# Patient Record
Sex: MALE | Race: WHITE | NOT HISPANIC OR LATINO | Employment: FULL TIME | URBAN - NONMETROPOLITAN AREA
[De-identification: names, ages, dates, MRNs, and addresses within clinical notes are randomized per-mention and may not be internally consistent; named-entity substitution may affect disease eponyms.]

---

## 2023-06-22 ENCOUNTER — OFFICE VISIT (OUTPATIENT)
Dept: PRIMARY CARE | Facility: CLINIC | Age: 48
End: 2023-06-22
Payer: COMMERCIAL

## 2023-06-22 VITALS
HEIGHT: 72 IN | WEIGHT: 228.5 LBS | SYSTOLIC BLOOD PRESSURE: 140 MMHG | DIASTOLIC BLOOD PRESSURE: 100 MMHG | OXYGEN SATURATION: 100 % | BODY MASS INDEX: 30.95 KG/M2 | HEART RATE: 63 BPM

## 2023-06-22 DIAGNOSIS — G44.85 PRIMARY STABBING HEADACHE: Primary | ICD-10-CM

## 2023-06-22 PROCEDURE — 99214 OFFICE O/P EST MOD 30 MIN: CPT | Performed by: FAMILY MEDICINE

## 2023-06-22 PROCEDURE — 1036F TOBACCO NON-USER: CPT | Performed by: FAMILY MEDICINE

## 2023-06-22 RX ORDER — PROPRANOLOL HYDROCHLORIDE 120 MG/1
120 CAPSULE, EXTENDED RELEASE ORAL NIGHTLY
Qty: 30 CAPSULE | Refills: 0 | Status: SHIPPED | OUTPATIENT
Start: 2023-06-22 | End: 2023-07-17 | Stop reason: ALTCHOICE

## 2023-06-22 RX ORDER — IBUPROFEN 600 MG/1
TABLET ORAL
COMMUNITY

## 2023-06-22 ASSESSMENT — PATIENT HEALTH QUESTIONNAIRE - PHQ9
1. LITTLE INTEREST OR PLEASURE IN DOING THINGS: NOT AT ALL
2. FEELING DOWN, DEPRESSED OR HOPELESS: NOT AT ALL
SUM OF ALL RESPONSES TO PHQ9 QUESTIONS 1 AND 2: 0

## 2023-06-22 NOTE — PROGRESS NOTES
"Subjective   Patient ID: Iva Greenwood is a 48 y.o. male who presents for ER f/u and mdck.    HPI   ED for cp neg ct chest cmp bmp and troponin  Granuloma chronic     Went to ED had BM and nose started running and plugged and blew nose with with mucus  Then N/V none for 5 minutes   Otherwise felt fine except for HA since last thursdya  Sob and lightheaded after shower  And was white pale  5 days now constant on the right side   Temple to occipital and hurts to open right eye due to photophobia  Yesterday am no HA  then sneezed had HA acutely   Not as sharp but dull   Never has UNGER like this before   This is different than his HA usuallty that he gets maybe 2 x year    Bp high in ed and today   No cp palpitations s  Pulse ox at home and bp cuff  130 /90   10/10 HA at times     Review of Systems  No visual changes  HA start 2 days before ED visits   Ibuprofen and aspirin  Acute onset HA start in temple     Objective   BP (!) 140/100   Pulse 63   Ht 1.828 m (5' 11.97\")   Wt 104 kg (228 lb 8 oz)   SpO2 100%   BMI 31.02 kg/m²     Physical Exam  Constitutional:       Appearance: Normal appearance.   HENT:      Head: Normocephalic and atraumatic.   Eyes:      Conjunctiva/sclera: Conjunctivae normal.      Pupils: Pupils are equal, round, and reactive to light.   Cardiovascular:      Rate and Rhythm: Normal rate and regular rhythm.      Heart sounds: Normal heart sounds.   Pulmonary:      Effort: Pulmonary effort is normal.      Breath sounds: Normal breath sounds.   Lymphadenopathy:      Cervical: No cervical adenopathy.   Skin:     Coloration: Skin is not jaundiced.   Neurological:      General: No focal deficit present.      Mental Status: He is alert and oriented to person, place, and time.      Cranial Nerves: No cranial nerve deficit.      Sensory: No sensory deficit.      Motor: No weakness.      Coordination: Coordination normal.      Gait: Gait normal.      Deep Tendon Reflexes: Reflexes normal.   Psychiatric:   "       Mood and Affect: Mood normal.         Behavior: Behavior normal.         Thought Content: Thought content normal.         Judgment: Judgment normal.         Assessment/Plan   Diagnoses and all orders for this visit:  Primary stabbing headache  -     MR brain w and wo IV contrast; Future  -     propranolol LA (Inderal LA) 120 mg 24 hr capsule; Take 1 capsule (120 mg) by mouth once daily at bedtime. Do not crush, chew, or split.      States cough exertion and sneezing cause HA to worsen

## 2023-07-13 ENCOUNTER — TELEPHONE (OUTPATIENT)
Dept: PRIMARY CARE | Facility: CLINIC | Age: 48
End: 2023-07-13

## 2023-07-13 NOTE — TELEPHONE ENCOUNTER
PT WOULD LIKE A CALL BACK IN REGARDS TO HIS MRI RESULTS. HE HAD AN APPT THIS MORNING AND SHOWED UP LATE. HE RESCHEDULED FOR MONDAY THE 17TH. HOWEVER, IF THIS IS SOMETHING THAT CAN BE DISCUSSED OVER THE PHONE HE WOULD RATHER DO THAT INSTEAD OF COMING INTO THE OFFICE.    THANK YOU - LAMONTE CARRINGTON [phone]

## 2023-07-13 NOTE — TELEPHONE ENCOUNTER
Called patient and let him know that we will see him on the 17th and go over his BP recording that day

## 2023-07-17 ENCOUNTER — OFFICE VISIT (OUTPATIENT)
Dept: PRIMARY CARE | Facility: CLINIC | Age: 48
End: 2023-07-17
Payer: COMMERCIAL

## 2023-07-17 VITALS
DIASTOLIC BLOOD PRESSURE: 88 MMHG | BODY MASS INDEX: 30.34 KG/M2 | OXYGEN SATURATION: 99 % | HEIGHT: 72 IN | WEIGHT: 224 LBS | SYSTOLIC BLOOD PRESSURE: 110 MMHG | HEART RATE: 80 BPM

## 2023-07-17 DIAGNOSIS — R06.00 DYSPNEA, UNSPECIFIED TYPE: Primary | ICD-10-CM

## 2023-07-17 DIAGNOSIS — G44.85 PRIMARY STABBING HEADACHE: ICD-10-CM

## 2023-07-17 PROCEDURE — 1036F TOBACCO NON-USER: CPT | Performed by: FAMILY MEDICINE

## 2023-07-17 PROCEDURE — 99213 OFFICE O/P EST LOW 20 MIN: CPT | Performed by: FAMILY MEDICINE

## 2023-07-17 NOTE — PROGRESS NOTES
"Subjective   Patient ID: Iva Greenwood is a 48 y.o. male who presents for f/u (Review MRI results and BP).    HPI     Had headache  2 weeks and now  no headache     Home bp 125/94  123/89  128/93  127/94     Not taking the inderal now   Since HA is gone     Only complaint is sob with exertion  However not engaged in any aerobic exercise  Is able to work hard physically all day without cp       Review of Systems    Objective   /88   Pulse 80   Ht 1.828 m (5' 11.97\")   Wt 102 kg (224 lb)   SpO2 99%   BMI 30.41 kg/m²     Physical Exam  Constitutional:       Appearance: Normal appearance.   HENT:      Head: Normocephalic and atraumatic.   Eyes:      Conjunctiva/sclera: Conjunctivae normal.      Pupils: Pupils are equal, round, and reactive to light.   Cardiovascular:      Rate and Rhythm: Normal rate and regular rhythm.      Heart sounds: Normal heart sounds.   Pulmonary:      Effort: Pulmonary effort is normal.      Breath sounds: Normal breath sounds.   Lymphadenopathy:      Cervical: No cervical adenopathy.   Skin:     Coloration: Skin is not jaundiced.   Neurological:      General: No focal deficit present.      Mental Status: He is alert and oriented to person, place, and time.   Psychiatric:         Mood and Affect: Mood normal.         Behavior: Behavior normal.         Thought Content: Thought content normal.         Judgment: Judgment normal.         Assessment/Plan   Diagnoses and all orders for this visit:  Dyspnea, unspecified type  Primary stabbing headache    Mri brain normal     Recommend 150 aerobic activity   If still no able to improve will get pft and stres test          "

## 2024-07-10 ENCOUNTER — OFFICE VISIT (OUTPATIENT)
Dept: PRIMARY CARE | Facility: CLINIC | Age: 49
End: 2024-07-10
Payer: COMMERCIAL

## 2024-07-10 VITALS
OXYGEN SATURATION: 97 % | BODY MASS INDEX: 30.13 KG/M2 | SYSTOLIC BLOOD PRESSURE: 128 MMHG | WEIGHT: 222 LBS | HEART RATE: 82 BPM | DIASTOLIC BLOOD PRESSURE: 74 MMHG

## 2024-07-10 DIAGNOSIS — L98.9 ARM SKIN LESION, RIGHT: Primary | ICD-10-CM

## 2024-07-10 PROCEDURE — 1036F TOBACCO NON-USER: CPT | Performed by: FAMILY MEDICINE

## 2024-07-10 PROCEDURE — 0752T DGTZ GLS MCRSCP SLD LVL III: CPT

## 2024-07-10 PROCEDURE — 17110 DESTRUCTION B9 LES UP TO 14: CPT | Performed by: FAMILY MEDICINE

## 2024-07-10 PROCEDURE — 88304 TISSUE EXAM BY PATHOLOGIST: CPT

## 2024-07-10 NOTE — PROGRESS NOTES
Subjective   Patient ID: Iva Greenwood is a 49 y.o. male who presents for Follow-up (Check spot on right elbow).    HPI: Has had a lesion on his right elbow for several weeks.  Few days ago he scraped off the top part of it.  And there was some pustular material that drained from it.  But it has the appearance of a wart on the surface.  It is about 5 mm in diameter and raised up about 3 to 4 mm.  There is a small eschar in the central area.  So to resolve the problem and to clearly identify what this is we set him up for removal after obtaining consent.  Local anesthesia with 1 mL of 1% plain.  Prepped with alcohol.  Excised at the skin  Cauterized x 2.  The base did not have the typical appearance of a wart.  So I am thinking this may be a basal cell cancer.  Antibiotic ointment and Band-Aid applied.  He can shower and change the Band-Aid and antibiotic ointment tomorrow morning.  Lesion sent for pathology    Review of Systems    Objective   /74 (BP Location: Left arm, Patient Position: Sitting)   Pulse 82   Wt 101 kg (222 lb)   SpO2 97%   BMI 30.13 kg/m²     Physical Exam    Assessment/Plan

## 2024-07-23 LAB
LABORATORY COMMENT REPORT: NORMAL
PATH REPORT.FINAL DX SPEC: NORMAL
PATH REPORT.GROSS SPEC: NORMAL
PATH REPORT.RELEVANT HX SPEC: NORMAL
PATH REPORT.TOTAL CANCER: NORMAL